# Patient Record
Sex: FEMALE | Race: WHITE | NOT HISPANIC OR LATINO | Employment: OTHER | URBAN - METROPOLITAN AREA
[De-identification: names, ages, dates, MRNs, and addresses within clinical notes are randomized per-mention and may not be internally consistent; named-entity substitution may affect disease eponyms.]

---

## 2017-12-26 PROBLEM — K57.92 DIVERTICULITIS OF INTESTINE: Status: ACTIVE | Noted: 2017-12-26

## 2018-09-01 ENCOUNTER — OFFICE VISIT (OUTPATIENT)
Dept: URGENT CARE | Facility: CLINIC | Age: 73
End: 2018-09-01
Payer: MEDICARE

## 2018-09-01 VITALS
HEIGHT: 64 IN | OXYGEN SATURATION: 100 % | BODY MASS INDEX: 21.17 KG/M2 | RESPIRATION RATE: 15 BRPM | DIASTOLIC BLOOD PRESSURE: 76 MMHG | WEIGHT: 124 LBS | SYSTOLIC BLOOD PRESSURE: 119 MMHG | TEMPERATURE: 97 F | HEART RATE: 61 BPM

## 2018-09-01 DIAGNOSIS — R30.0 DYSURIA: Primary | ICD-10-CM

## 2018-09-01 LAB
BILIRUB UR QL STRIP: NEGATIVE
GLUCOSE UR QL STRIP: NEGATIVE
KETONES UR QL STRIP: NEGATIVE
LEUKOCYTE ESTERASE UR QL STRIP: NEGATIVE
PH, POC UA: 6 (ref 5–8)
POC BLOOD, URINE: NEGATIVE
POC NITRATES, URINE: NEGATIVE
PROT UR QL STRIP: NEGATIVE
SP GR UR STRIP: 1 (ref 1–1.03)
UROBILINOGEN UR STRIP-ACNC: NORMAL (ref 0.1–1.1)

## 2018-09-01 PROCEDURE — 99204 OFFICE O/P NEW MOD 45 MIN: CPT | Mod: 25,S$GLB,, | Performed by: FAMILY MEDICINE

## 2018-09-01 PROCEDURE — 81003 URINALYSIS AUTO W/O SCOPE: CPT | Mod: QW,S$GLB,, | Performed by: FAMILY MEDICINE

## 2018-09-01 NOTE — PROGRESS NOTES
"Subjective:       Patient ID: Ethel Oliveira is a 72 y.o. female.    Vitals:  height is 5' 4" (1.626 m) and weight is 56.2 kg (124 lb). Her oral temperature is 97.1 °F (36.2 °C). Her blood pressure is 119/76 and her pulse is 61. Her respiration is 15 and oxygen saturation is 100%.     Chief Complaint: Urinary Tract Infection    PATIENT WAS SEEN IN THE EMERGENCY DEPARTMENT SERVRAL TIMES RECENTLY FOR CHRONIC ABDOMINAL PAIN, CONSTIPATION. SHE WAS ADVISED TO USE MAG. CITRATE AND HAD WATERY STOOLS AFTERWARDS. SHE WOULD LIKE TO R/O UTI SINCE HAVING WATERY STOOLS. SHE DENIES DYSURIA, URINARY URGENCY/FREQUENCY.      Urinary Tract Infection    This is a new problem. The current episode started acute onset. There has been no fever. Pertinent negatives include no chills, hematuria, nausea, urgency or vomiting.     Review of Systems   Constitution: Negative for chills and fever.   Skin: Negative for itching.   Musculoskeletal: Negative for back pain.   Gastrointestinal: Negative for abdominal pain, nausea and vomiting.   Genitourinary: Negative for dysuria, genital sores, hematuria, missed menses, non-menstrual bleeding and urgency.       Objective:      Physical Exam   Constitutional: She appears well-developed and well-nourished.   HENT:   Head: Normocephalic and atraumatic.   Cardiovascular: Regular rhythm.   Pulmonary/Chest: Breath sounds normal.   Abdominal: Soft. Bowel sounds are normal. There is no tenderness. There is no guarding. A hernia is present.       Assessment:       1. Dysuria        Plan:         Dysuria  -     POCT Urinalysis, Dipstick, Automated, W/O Scope     Urinalysis normal no signs of infection patient reassured she should discuss questions about her constipation and adhesions with her colorectal surgeon.  Stable for discharge.     "

## 2018-12-14 PROBLEM — K43.9 VENTRAL HERNIA WITHOUT OBSTRUCTION OR GANGRENE: Status: ACTIVE | Noted: 2018-12-14

## 2019-03-25 ENCOUNTER — OFFICE VISIT (OUTPATIENT)
Dept: URGENT CARE | Facility: CLINIC | Age: 74
End: 2019-03-25
Payer: MEDICARE

## 2019-03-25 VITALS
OXYGEN SATURATION: 100 % | HEIGHT: 64 IN | RESPIRATION RATE: 16 BRPM | DIASTOLIC BLOOD PRESSURE: 82 MMHG | WEIGHT: 123 LBS | SYSTOLIC BLOOD PRESSURE: 128 MMHG | TEMPERATURE: 97 F | HEART RATE: 63 BPM | BODY MASS INDEX: 21 KG/M2

## 2019-03-25 DIAGNOSIS — G89.29 CHRONIC ABDOMINAL PAIN: Primary | ICD-10-CM

## 2019-03-25 DIAGNOSIS — R10.9 CHRONIC ABDOMINAL PAIN: Primary | ICD-10-CM

## 2019-03-25 PROCEDURE — 99214 PR OFFICE/OUTPT VISIT, EST, LEVL IV, 30-39 MIN: ICD-10-PCS | Mod: S$GLB,,, | Performed by: FAMILY MEDICINE

## 2019-03-25 PROCEDURE — 99214 OFFICE O/P EST MOD 30 MIN: CPT | Mod: S$GLB,,, | Performed by: FAMILY MEDICINE

## 2019-03-25 NOTE — PATIENT INSTRUCTIONS
Unknown Causes of Abdominal Pain (Female)    The exact cause of your abdominal (stomach) pain is not clear. This does not mean that this is something to worry about. Everyone likes to know the exact cause of the problem, but sometimes with abdominal pain, there is no clear-cut cause, and this could be a good thing. The good news is that your symptoms can be treated, and you will feel better.   Your condition does not seem serious now; however, sometimes the signs of a serious problem may take more time to appear. For this reason, it is important for you to watch for any new symptoms, problems, or worsening of your condition.  Over the next few days, the abdominal pain may come and go, or be continuous. Other common symptoms can include nausea and vomiting. Sometimes it can be difficult to tell if you feel nauseous, you may just feel bad and not associate that feeling with nausea. Constipation, diarrhea, and a fever may go along with the pain.  The pain may continue even if treated correctly over the following days. Depending on how things go, sometimes the cause can become clear and may require further or different treatment. Additional evaluations, medications, or tests may also be needed.  Home care  Your healthcare provider may prescribe medicine for pain, symptoms, or an infection.  Follow the healthcare provider's instructions for taking these medicines.  General care  · Rest as much as you can until your next exam. No strenuous activities.  · Try to find positions that ease discomfort. A small pillow placed on the abdomen may help relieve pain.  · Something warm on your abdomen (such as a heating pad) may help, but be careful not to burn yourself.  Diet  · Do not force yourself to eat, especially if having cramps, vomiting, or diarrhea.  · Water is important so you do not get dehydrated. Soup may also be good. Sports drinks may also help, especially if they are not too acidic. Make sure you don't drink  sugary drinks as this can make things worse. Take liquids in small amounts. Do not guzzle them.  · Caffeine sometimes makes the pain and cramping worse.  · Avoid dairy products if you have vomiting or diarrhea.  · Don't eat large amounts at a time. Wait a few minutes between bites.  · Eat a diet low in fiber (called a low-residue diet). Foods allowed include refined breads, white rice, fruit and vegetable juices without pulp, tender meats. These foods will pass more easily through the intestine.  · Avoid whole-grain foods, whole fruits and vegetables, meats, seeds and nuts, fried or fatty foods, dairy, alcohol and spicy foods until your symptoms go away.  Follow-up care  Follow up with your healthcare provider, or as advised, if your pain does not begin to improve in the next 24 hours.  Call 911  Call 911 if any of these occur:  · Trouble breathing  · Confusion  · Fainting or loss of consciousness  · Rapid heart rate  · Seizure  When to seek medical advice  Call your healthcare provider right away if any of these occur:  · Pain gets worse or moves to the right lower abdomen  · New or worsening vomiting or diarrhea  · Swelling of the abdomen  · Unable to pass stool for more than 3 days  · Fever of 100.4ºF (38ºC) or higher, or as directed by your healthcare provider.  · Blood in vomit or bowel movements (dark red or black color)  · Jaundice (yellow color of eyes and skin)  · Weakness, dizziness  · Chest, arm, back, neck or jaw pain  · Unexpected vaginal bleeding or missed period  · Can't keep down liquids or water and are getting dehydrated  Date Last Reviewed: 12/30/2015  © 5209-3938 [employer]. [address] [place] [address], [place] [address]. All rights reserved. This information is not intended as a substitute for professional medical care. Always follow your healthcare professional's instructions.

## 2019-03-25 NOTE — PROGRESS NOTES
"Subjective:       Patient ID: Ethel Oliveira is a 73 y.o. female.    Vitals:  height is 5' 4" (1.626 m) and weight is 55.8 kg (123 lb). Her oral temperature is 96.9 °F (36.1 °C). Her blood pressure is 128/82 and her pulse is 63. Her respiration is 16 and oxygen saturation is 100%.     Chief Complaint: Rib Injury (pt states that her ribs on the left side are hurting.  States that she had a hernia operation in December and its been hurting ever since. )    Chest Pain    This is a recurrent problem. The current episode started more than 1 month ago. The problem occurs constantly. The problem has been unchanged. The pain is at a severity of 8/10. The pain is severe. The pain does not radiate. Associated symptoms include abdominal pain and back pain. Pertinent negatives include no cough, dizziness, fever, headaches, nausea, shortness of breath, vomiting or weakness. The pain is aggravated by nothing. She has tried nothing for the symptoms.     reviewing the patient's old chart she has apparently been seen by her surgeon as well as an emergency room visit for similar complaints.  Her workup has not been revealing for significant problems. She presents today wishing to be reassured that there is nothing new or dangerous going on and for a 3rd opinion.  Knee this year  states that there is any significant change in the symptoms to dental today or this week simply that she is continues to be discomfort.  She denies fever chills worsening abdominal pain vomiting diarrhea or acute change in bowel or bladder habits.    Constitution: Negative for chills, fatigue and fever.   HENT: Negative for congestion and sore throat.    Neck: Negative for painful lymph nodes.   Cardiovascular: Positive for chest pain. Negative for leg swelling.   Eyes: Negative for double vision and blurred vision.   Respiratory: Negative for cough and shortness of breath.    Gastrointestinal: Positive for abdominal pain. Negative for nausea, vomiting " and diarrhea.   Genitourinary: Negative for dysuria, frequency, urgency and history of kidney stones.   Musculoskeletal: Positive for back pain. Negative for joint pain, joint swelling, muscle cramps and muscle ache.   Skin: Negative for color change, pale, rash and bruising.   Allergic/Immunologic: Negative for seasonal allergies.   Neurological: Negative for dizziness, history of vertigo, light-headedness, passing out and headaches.   Hematologic/Lymphatic: Negative for swollen lymph nodes.   Psychiatric/Behavioral: Negative for nervous/anxious, sleep disturbance and depression. The patient is not nervous/anxious.        Objective:      Physical Exam   Constitutional: She is oriented to person, place, and time. She appears well-developed and well-nourished.   HENT:   Head: Normocephalic and atraumatic.   Nose: Nose normal.   Mouth/Throat: Oropharynx is clear and moist and mucous membranes are normal.   Eyes: Pupils are equal, round, and reactive to light. Conjunctivae, EOM and lids are normal. Right eye exhibits no discharge. Left eye exhibits no discharge. No scleral icterus.   Neck: Trachea normal, normal range of motion and full passive range of motion without pain. Neck supple.   Cardiovascular: Normal rate, regular rhythm and normal heart sounds.   Pulmonary/Chest: Effort normal and breath sounds normal. No respiratory distress.   Abdominal: Soft. Normal appearance and bowel sounds are normal. She exhibits no distension, no abdominal bruit, no pulsatile midline mass and no mass. There is no tenderness.   No significant abdominal tenderness to direct palpation bowel sounds are present no rebound referred pain or other positive peritoneal findings   Musculoskeletal: Normal range of motion. She exhibits no edema.   Neurological: She is alert and oriented to person, place, and time. She has normal strength. No cranial nerve deficit.   Skin: Skin is warm, dry and intact. She is not diaphoretic.   Psychiatric: She  has a normal mood and affect. Her speech is normal and behavior is normal. Cognition and memory are normal.   Nursing note and vitals reviewed.      Assessment:       1. Chronic abdominal pain        Plan:         Chronic abdominal pain     Next warm the patient and her  that I do not see any acute issues here that require urgent intervention.  They are encouraged to keep the appointment she has in 2 weeks with gastroenterologist to follow up for additional evaluation.  I am not recommending any additional prescriptions and she does not wish to have any.

## 2019-03-28 ENCOUNTER — TELEPHONE (OUTPATIENT)
Dept: URGENT CARE | Facility: CLINIC | Age: 74
End: 2019-03-28

## 2019-03-28 NOTE — TELEPHONE ENCOUNTER
Patient callback- Pt states that she is not feeling better but she has a GI appointment scheduled for next week.

## 2019-06-28 PROBLEM — R10.9 ABDOMINAL PAIN: Status: ACTIVE | Noted: 2019-06-28

## 2019-10-01 ENCOUNTER — OFFICE VISIT (OUTPATIENT)
Dept: GASTROENTEROLOGY | Facility: CLINIC | Age: 74
End: 2019-10-01
Payer: MEDICARE

## 2019-10-01 VITALS
DIASTOLIC BLOOD PRESSURE: 73 MMHG | BODY MASS INDEX: 20.73 KG/M2 | WEIGHT: 129 LBS | HEART RATE: 69 BPM | SYSTOLIC BLOOD PRESSURE: 106 MMHG | HEIGHT: 66 IN | RESPIRATION RATE: 19 BRPM

## 2019-10-01 DIAGNOSIS — Z87.19 HISTORY OF CHRONIC CONSTIPATION: ICD-10-CM

## 2019-10-01 DIAGNOSIS — Z87.19 HISTORY OF GASTRITIS: ICD-10-CM

## 2019-10-01 DIAGNOSIS — R10.84 GENERALIZED ABDOMINAL PAIN: Primary | ICD-10-CM

## 2019-10-01 DIAGNOSIS — Z90.49 S/P PARTIAL RESECTION OF COLON: ICD-10-CM

## 2019-10-01 DIAGNOSIS — R13.14 PHARYNGOESOPHAGEAL DYSPHAGIA: ICD-10-CM

## 2019-10-01 DIAGNOSIS — Z90.49 S/P CHOLECYSTECTOMY: ICD-10-CM

## 2019-10-01 PROCEDURE — 99214 PR OFFICE/OUTPT VISIT, EST, LEVL IV, 30-39 MIN: ICD-10-PCS | Mod: S$PBB,,, | Performed by: NURSE PRACTITIONER

## 2019-10-01 PROCEDURE — 99215 OFFICE O/P EST HI 40 MIN: CPT | Mod: PBBFAC,PO | Performed by: NURSE PRACTITIONER

## 2019-10-01 PROCEDURE — 99999 PR PBB SHADOW E&M-EST. PATIENT-LVL V: ICD-10-PCS | Mod: PBBFAC,,, | Performed by: NURSE PRACTITIONER

## 2019-10-01 PROCEDURE — 99999 PR PBB SHADOW E&M-EST. PATIENT-LVL V: CPT | Mod: PBBFAC,,, | Performed by: NURSE PRACTITIONER

## 2019-10-01 PROCEDURE — 99214 OFFICE O/P EST MOD 30 MIN: CPT | Mod: S$PBB,,, | Performed by: NURSE PRACTITIONER

## 2019-10-01 RX ORDER — HYDROCORTISONE ACETATE PRAMOXINE HCL 2.5; 1 G/100G; G/100G
CREAM TOPICAL 2 TIMES DAILY PRN
Refills: 3 | COMMUNITY
Start: 2019-09-20

## 2019-10-01 NOTE — PATIENT INSTRUCTIONS
Abdominal Pain    Abdominal pain is pain in the stomach or belly area. Everyone has this pain from time to time. In many cases it goes away on its own. But abdominal pain can sometimes be due to a serious problem, such as appendicitis. So its important to know when to seek help.  Causes of abdominal pain  There are many possible causes of abdominal pain. Common causes in adults include:  · Constipation, diarrhea, or gas  · Stomach acid flowing back up into the esophagus (acid reflux or heartburn)  · Severe acid reflux, called GERD (gastroesophageal reflux disease)  · A sore in the lining of the stomach or small intestine (peptic ulcer)  · Inflammation of the gallbladder, liver, or pancreas  · Gallstones or kidney stones  · Appendicitis   · Intestinal blockage   · An internal organ pushing through a muscle or other tissue (hernia)  · Urinary tract infections  · In women, menstrual cramps, fibroids, or endometriosis  · Inflammation or infection of the intestines  Diagnosing the cause of abdominal pain  Your healthcare provider will do a physical exam help find the cause of your pain. If needed, tests will be ordered. Belly pain has many possible causes. So it can be hard to find the reason for your pain. Giving details about your pain can help. Tell your provider where and when you feel the pain, and what makes it better or worse. Also let your provider know if you have other symptoms such as:  · Fever  · Tiredness  · Upset stomach (nausea)  · Vomiting  · Changes in bathroom habits  Treating abdominal pain  Some causes of pain need emergency medical treatment right away. These include appendicitis or a bowel blockage. Other problems can be treated with rest, fluids, or medicines. Your healthcare provider can give you specific instructions for treatment or self-care based on what is causing your pain.  If you have vomiting or diarrhea, sip water or other clear fluids. When you are ready to eat solid foods again,  start with small amounts of easy-to-digest, low-fat foods. These include apple sauce, toast, or crackers.   When to seek medical care  Call 911 or go to the hospital right away if you:  · Cant pass stool and are vomiting  · Are vomiting blood or have bloody diarrhea or black, tarry diarrhea  · Have chest, neck, or shoulder pain  · Feel like you might pass out  · Have pain in your shoulder blades with nausea  · Have sudden, severe belly pain  · Have new, severe pain unlike any you have felt before  · Have a belly that is rigid, hard, and tender to touch  Call your healthcare provider if you have:  · Pain for more than 5 days  · Bloating for more than 2 days  · Diarrhea for more than 5 days  · A fever of 100.4°F (38.0°C) or higher, or as directed by your provider  · Pain that gets worse  · Weight loss for no reason  · Continued lack of appetite  · Blood in your stool  How to prevent abdominal pain  Here are some tips to help prevent abdominal pain:  · Eat smaller amounts of food at one time.  · Avoid greasy, fried, or other high-fat foods.  · Avoid foods that give you gas.  · Exercise regularly.  · Drink plenty of fluids.  To help prevent GERD symptoms:  · Quit smoking.  · Reduce alcohol and certain foods that increase stomach acid.  · Avoid aspirin and over-the-counter pain and fever medicines (NSAIDS or nonsteroidal anti-inflammatory drugs), if possible  · Lose extra weight.  · Finish eating at least 2 hours before you go to bed or lie down.  · Raise the head of your bed.  Date Last Reviewed: 7/1/2016  © 0796-6770 [employer]. [address] [place] [address], [place] [address]. All rights reserved. This information is not intended as a substitute for professional medical care. Always follow your healthcare professional's instructions.          Discharge Instructions: Eating a Soft Diet  You have been prescribed a soft diet (also called gastrointestinal soft diet or bland diet). This reduces the amount of work  your digestive tract has to do. It also reduces the chance that your digestive tract will be irritated by the food you eat. A soft diet is prescribed for people with digestive problems. The diet consists of foods that are tender, mildly seasoned, and easy to digest. While on this diet, you should not eat fried or spicy foods, or raw fruits and vegetables. Also avoid alcoholic beverages.  General guidelines  · Eat in a calm, relaxed atmosphere. How you eat may be as important as what you eat. Dont rush while eating. Chew your food slowly and thoroughly, and swallow slowly.  · Eat small frequent meals throughout the day, but dont eat within 2 hours of bedtime.  · Avoid any foods that cause discomfort.  · Dont use NSAIDs (nonsteroidal anti-inflammatory drugs), such as aspirin, and ibuprofen. Also avoid medicine that contain aspirin. NSAIDs can cause ulcers and delay or prevent ulcer healing.  · Use antacids as needed, but keep in mind that magnesium-containing antacids may cause diarrhea.  Foods to eat  · Cream of wheat and cream of rice  · Cooked white rice  · Mashed potatoes, and boiled potatoes without skin  · Plain pasta and noodles  · Plain white crackers (such as no-salt soda crackers)  · White bread  · Applesauce  · Cooked fruits without skins or seeds  · Mild juices, such as apple and grape  · Bananas  · Cooked or mashed vegetables without stems and seeds  ? Carrots  ? Summer squash (zucchini, yellow squash)  ? Winter squash (acorn, butternut, spaghetti squash)  · Cottage cheese  · Mild hard or soft cheeses  · Custard  · Yogurt without seeds or nuts  · Milk (you may need lactose-free milk)  · Ice cream without seeds or nuts  · Smooth peanut butter  · Eggs  · Fish, turkey, chicken, or other meat that is not tough or stringy  · Tofu  Foods to avoid  · Nuts and seeds  · Snack foods, such as the following:  ? Chocolate-containing snacks, candy, pastries, or cakes.  ? Potato chips (plain, barbecued, or other  flavors)  ? Taco chips or nachos  ? Corn chips  ? Popcorn, popcorn cakes, or rice cakes  ? Crackers with nuts, seeds, or spicy seasonings  ? French fries  · Fried or greasy foods  · Whole-grain breads, rolls, and crackers  · Breads and rolls with nuts, seeds, or bran  · Bran and granola cereals  · Berries with seeds, such as strawberries, raspberries, and blackberries  · Acidic fruits, such as oranges, grapefruits, aleksander, limes, and pineapples  · Raw vegetables  · Mild or hot peppers  · Sauerkraut and pickled vegetables  · Tomatoes or tomato products, such as tomato paste, tomato sauce, and tomato juice  · Barbecue sauce  · Spicy or flavored cheeses, such as jalapeño and black pepper cheese  · Crunchy peanut butter  · Dried cooked beans, such as manzo, kidney, or navy beans  · The following meats:  ? Fried or greasy meats  ? Processed, spicy meats, such as sausage, ambriz, ham, and lunch meats  ? Ribs and other meats with barbecue sauce  ? Tough or stringy meats, such as corned beef or beef jerky  Fluids to avoid  · Alcoholic beverages  · Coffee and regular teas  · Tahir and other drinks with caffeine  · Cranberry, orange, pineapple, and grapefruit juice  · Lemonade  · Vegetable juice  · Whole milk, if you are lactose intolerant  Follow-up  Make a follow-up appointment with a dietitian as directed by our staff.  Date Last Reviewed: 6/21/2015 © 2000-2017 [employer]. [address] [place] [address]. All rights reserved. This information is not intended as a substitute for professional medical care. Always follow your healthcare professional's instructions.          Constipation (Adult)  Constipation means that you have bowel movements that are less frequent than usual. Stools often become very hard and difficult to pass.  Constipation is very common. At some point in life it affects almost everyone. Since everyone's bowel habits are different, what is constipation to one person may not be to  another. Your healthcare provider may do tests to diagnose constipation. It depends on what he or she finds when evaluating you.    Symptoms of constipation include:  · Abdominal pain  · Bloating  · Vomiting  · Painful bowel movements  · Itching, swelling, bleeding, or pain around the anus  Causes  Constipation can have many causes. These include:  · Diet low in fiber  · Too much dairy  · Not drinking enough liquids  · Lack of exercise or physical activity. This is especially true for older adults.  · Changes in lifestyle or daily routine, including pregnancy, aging, work, and travel  · Frequent use or misuse of laxatives  · Ignoring the urge to have a bowel movement or delaying it until later  · Medicines, such as certain prescription pain medicines, iron supplements, antacids, certain antidepressants, and calcium supplements  · Diseases like irritable bowel syndrome, bowel obstructions, stroke, diabetes, thyroid disease, Parkinson disease, hemorrhoids, and colon cancer  Complications  Potential complications of constipation can include:  · Hemorrhoids  · Rectal bleeding from hemorrhoids or anal fissures (skin tears)  · Hernias  · Dependency on laxatives  · Chronic constipation  · Fecal impaction  · Bowel obstruction or perforation  Home care  All treatment should be done after talking with your healthcare provider. This is especially true if you have another medical problems, are taking prescription medicines, or are an older adult. Treatment most often involves lifestyle changes. You may also need medicines. Your healthcare provider will tell you which will work best for you. Follow the advice below to help avoid this problem in the future.  Lifestyle changes  These lifestyle changes can help prevent constipation:  · Diet. Eat a high-fiber diet, with fresh fruit and vegetables, and reduce dairy intake, meats, and processed foods  · Fluids. It's important to get enough fluids each day. Drink plenty of water when  you eat more fiber. If you are on diet that limits the amount of fluid you can have, talk about this with your healthcare provider.  · Regular exercise. Check with your healthcare provider first.  Medications  Take any medicines as directed. Some laxatives are safe to use only every now and then. Others can be taken on a regular basis. Talk with your doctor or pharmacist if you have questions.  Prescription pain medicines can cause constipation. If you are taking this kind of medicine, ask your healthcare provider if you should also take a stool softener.  Medicines you may take to treat constipation include:  · Fiber supplements  · Stool softeners  · Laxatives  · Enemas  · Rectal suppositories  Follow-up care  Follow up with your healthcare provider if symptoms don't get better in the next few days. You may need to have more tests or see a specialist.  Call 911  Call 911 if any of these occur:  · Trouble breathing  · Stiff, rigid abdomen that is severely painful to touch  · Confusion  · Fainting or loss of consciousness  · Rapid heart rate  · Chest pain  When to seek medical advice  Call your healthcare provider right away if any of these occur:  · Fever over 100.4°F (38°C)  · Failure to resume normal bowel movements  · Pain in your abdomen or back gets worse  · Nausea or vomiting  · Swelling in your abdomen  · Blood in the stool  · Black, tarry stool  · Involuntary weight loss  · Weakness  Date Last Reviewed: 12/30/2015  © 0823-9700 [employer]. [address] [place] [id number]. All rights reserved. This information is not intended as a substitute for professional medical care. Always follow your healthcare professional's instructions.

## 2019-10-01 NOTE — PROGRESS NOTES
"Subjective:       Patient ID: Ethel Oliveira is a 73 y.o. female Body mass index is 20.82 kg/m².    Chief Complaint: Establish Care (abd pain, hx of colon resection, hx of hernia repair)    This patient is new to me.     Patient with history of chronic abdominal pain and chart shows multiple ER visits for abdominal pain since at least 2018.  Patient is here with her , whom assisted with history. Patient reports they wanted a second opinion.    Abdominal Pain   This is a chronic problem. The current episode started more than 1 year ago (started in 2017 with diverticulitis, multiple bouts for this and decided to have colon resection for it in 12/2017, later had to have hernia repair 12/2018). The problem occurs constantly. The problem has been unchanged. The pain is located in the periumbilical region and generalized abdominal region (mostly to periumbilical). The pain is at a severity of 7/10 (currently). The quality of the pain is sharp and aching. Associated symptoms include belching, constipation (Bowel movements 1-2 times a day; controlled with glycolax 17 grams 1-2 times a day) and nausea ("very little", zofran prn). Pertinent negatives include no diarrhea, dysuria, fever, flatus, frequency, hematochezia, melena, vomiting or weight loss. The pain is aggravated by eating. She has tried proton pump inhibitors (protonix 40 mg daily, carafate 10 ml TID, analpram BID PRN- has not taken recently; bulbuca BID; PAST: motegrity- diarrhea, linzess- no relief, bentyl mild relief) for the symptoms. Prior diagnostic workup includes CT scan, GI consult, lower endoscopy and upper endoscopy (has been seeing Dr. Moore recently). Her past medical history is significant for abdominal surgery and GERD (denies any recently). There is no history of Crohn's disease or pancreatitis. Patient's medical history includes UTI (on macrobid currently).     Review of Systems   Constitutional: Positive for appetite change (decreased " "because it hurts when she eats). Negative for chills, fatigue, fever and weight loss.   HENT: Positive for trouble swallowing (occasional with food; denies problems with liquids or pills). Negative for sore throat.    Respiratory: Negative for cough, choking and shortness of breath.    Cardiovascular: Negative for chest pain.   Gastrointestinal: Positive for abdominal pain, constipation (Bowel movements 1-2 times a day; controlled with glycolax 17 grams 1-2 times a day) and nausea ("very little", zofran prn). Negative for anal bleeding, blood in stool, diarrhea, flatus, hematochezia, melena, rectal pain and vomiting.   Genitourinary: Negative for difficulty urinating, dysuria, flank pain and frequency.   Musculoskeletal:        Reports taking belbuca BID   Neurological: Negative for weakness.        History of tremor- seeing neurologist       No LMP recorded. Patient is postmenopausal.  Past Medical History:   Diagnosis Date    Anticoagulant long-term use     Anxiety     Breast cyst     Constipation     Diverticulitis     Diverticulosis     High cholesterol     Hyperlipemia     Hypertension     IBS (irritable bowel syndrome)     Sciatica     Tremor      Past Surgical History:   Procedure Laterality Date    APPENDECTOMY      BREAST BIOPSY Right 1990    benign excisional biopsy    CHOLECYSTECTOMY      COLONOSCOPY N/A 6/28/2019    Procedure: COLONOSCOPY;  Surgeon: Roger Moore MD;  Location: [facility];  Service: Endoscopy;  Laterality: N/A;    COLONOSCOPY  06/28/2019    Dr. Moore, in procedures: diverticulosis, repeat in 5 years for screening    ESOPHAGOGASTRODUODENOSCOPY N/A 6/28/2019    Procedure: EGD (ESOPHAGOGASTRODUODENOSCOPY);  Surgeon: Roger Moore MD;  Location: [facility];  Service: Endoscopy;  Laterality: N/A;    HERNIA REPAIR      LAPAROSCOPIC REPAIR OF VENTRAL HERNIA N/A 12/14/2018    Procedure: REPAIR, HERNIA, VENTRAL, LAPAROSCOPIC with Mesh;  Surgeon: Mahamed CAMPOS" MD [place];  Location: [facility] OR;  Service: General;  Laterality: N/A;    SIGMOIDECTOMY  12/26/2017    TONSILLECTOMY      UPPER GASTROINTESTINAL ENDOSCOPY  06/28/2019    Dr. Moore, in procedures: small hiatal hernia, Erythematous mucosa in the stomach; biopsy: duodenum WNL, antrum: GASTRIC ANTRAL MUCOSA WITH REACTIVE CHANGES CONSISTENT WITH HEALING EROSION, negative for h pylori     Family History   Problem Relation Age of Onset    Breast cancer Mother 75    Colon cancer Mother 80    Breast cancer Sister 60    Breast cancer Paternal Aunt 55    Breast cancer Sister 68    Crohn's disease Neg Hx     Ulcerative colitis Neg Hx     Stomach cancer Neg Hx     Esophageal cancer Neg Hx      Wt Readings from Last 20 Encounters:   10/01/19 58.5 kg (128 lb 15.5 oz)   09/26/19 59.3 kg (130 lb 11.7 oz)   09/05/19 59 kg (130 lb 1.1 oz)   08/17/19 59 kg (130 lb 1.1 oz)   07/23/19 58.1 kg (128 lb)   07/02/19 59.7 kg (131 lb 9.8 oz)   06/28/19 58.1 kg (128 lb 1.4 oz)   06/15/19 58.9 kg (129 lb 13.6 oz)   05/02/19 57.7 kg (127 lb 3.3 oz)   04/29/19 56.7 kg (125 lb)   04/18/19 55.6 kg (122 lb 9.2 oz)   04/11/19 55.8 kg (123 lb)   04/02/19 55.8 kg (123 lb 0.3 oz)   03/25/19 55.8 kg (123 lb)   03/09/19 56.1 kg (123 lb 10.9 oz)   01/19/19 58.6 kg (129 lb 3 oz)   01/03/19 58.1 kg (128 lb)   12/31/18 58.1 kg (128 lb)   12/28/18 58.2 kg (128 lb 4.9 oz)   12/20/18 57.6 kg (127 lb)     Lab Results   Component Value Date    WBC 5.66 09/26/2019    HGB 12.0 09/26/2019    HCT 36.1 (L) 09/26/2019    MCV 94 09/26/2019     09/26/2019     CMP  Sodium   Date Value Ref Range Status   09/26/2019 139 136 - 145 mmol/L Final     Potassium   Date Value Ref Range Status   09/26/2019 4.3 3.5 - 5.1 mmol/L Final     Chloride   Date Value Ref Range Status   09/26/2019 103 95 - 110 mmol/L Final     CO2   Date Value Ref Range Status   09/26/2019 29 22 - 31 mmol/L Final     Glucose   Date Value Ref Range Status   09/26/2019 103 70 - 110 mg/dL  Final     Comment:     The ADA recommends the following guidelines for fasting glucose:  Normal:       less than 100 mg/dL  Prediabetes:  100 mg/dL to 125 mg/dL  Diabetes:     126 mg/dL or higher       BUN, Bld   Date Value Ref Range Status   09/26/2019 16 7 - 18 mg/dL Final     Creatinine   Date Value Ref Range Status   09/26/2019 0.58 0.50 - 1.40 mg/dL Final     Calcium   Date Value Ref Range Status   09/26/2019 9.0 8.4 - 10.2 mg/dL Final     Total Protein   Date Value Ref Range Status   09/26/2019 7.5 6.0 - 8.4 g/dL Final     Albumin   Date Value Ref Range Status   09/26/2019 3.9 3.5 - 5.2 g/dL Final     Total Bilirubin   Date Value Ref Range Status   09/26/2019 1.1 0.2 - 1.3 mg/dL Final     Alkaline Phosphatase   Date Value Ref Range Status   09/26/2019 57 38 - 145 U/L Final     AST ([place])   Date Value Ref Range Status   03/18/2016 28 14 - 36 U/L Final     AST   Date Value Ref Range Status   09/26/2019 31 14 - 36 U/L Final     ALT   Date Value Ref Range Status   09/26/2019 18 10 - 44 U/L Final     Anion Gap   Date Value Ref Range Status   09/26/2019 7 (L) 8 - 16 mmol/L Final     eGFR if    Date Value Ref Range Status   09/26/2019 >60 >60 mL/min/1.73 m^2 Final     eGFR if non    Date Value Ref Range Status   09/26/2019 >60 >60 mL/min/1.73 m^2 Final     Comment:     Calculation used to obtain the estimated glomerular filtration  rate (eGFR) is the CKD-EPI equation.        Lab Results   Component Value Date    AMYLASE 52 06/30/2017     Lab Results   Component Value Date    LIPASE 58 04/23/2018     Lab Results   Component Value Date    LIPASERES 21 (L) 09/26/2019     Reviewed prior medical records including radiology report of 9/26/19 abdominal x-ray and ER visit note; 8/17/19 and 7/2/19 ct scans of abdomen pelvis; 9/5/19 ER visit note; 8/17/19 ER visit note; & endoscopy history (see surgical history).    Objective:      Physical Exam   Constitutional: She is oriented to  person, place, and time. She appears well-developed and well-nourished. No distress.   Patient uses a cane for assistance with ambulation. Tremor noted by frequent movement of jaw.   HENT:   Mouth/Throat: Oropharynx is clear and moist and mucous membranes are normal. No oral lesions. No oropharyngeal exudate.   Eyes: Pupils are equal, round, and reactive to light. Conjunctivae are normal. No scleral icterus.   Pulmonary/Chest: Effort normal and breath sounds normal. No respiratory distress. She has no wheezes.   Abdominal: Soft. Normal appearance and bowel sounds are normal. She exhibits no distension, no abdominal bruit and no mass. There is tenderness (mild). There is no rigidity, no rebound, no guarding, no tenderness at McBurney's point and negative Dillard's sign.   Well-healed surgical scars noted.   Neurological: She is alert and oriented to person, place, and time.   Skin: Skin is warm and dry. No rash noted. She is not diaphoretic. No erythema. No pallor.   Non-jaundiced   Psychiatric: She has a normal mood and affect. Her behavior is normal. Judgment and thought content normal.   Nursing note and vitals reviewed.      Assessment:       1. Generalized abdominal pain    2. S/P partial resection of colon    3. S/P cholecystectomy    4. History of gastritis    5. History of chronic constipation    6. Pharyngoesophageal dysphagia        Plan:       Generalized abdominal pain & S/P cholecystectomy  -     VAS US Mesenteric Arterial; Future  - schedule EGD, discussed procedure with patient, including risks and benefits, patient verbalized understanding    History of gastritis  - CONTINUE PROTONIX 40 MG ONCE DAILY  - CONTINUE CARAFATE 1 GRAM TID AS DIRECTED  - avoid/minimize use of NSAIDs- since they can cause GI upset, bleeding and/or ulcers. If NSAID must be taken, recommend take with food.    History of chronic constipation & S/P partial resection of colon  Recommend daily exercise as tolerated, adequate water  intake (six 8-oz glasses of water daily), and high fiber diet. OTC fiber supplements are recommended if diet does not reach daily fiber goal (20-30 grams daily), such as Metamucil, Citrucel, or FiberCon (take as directed, separate from other oral medications by >2 hours).  -Recommend taking an OTC stool softener such as Colace as directed to avoid hard stools and straining with bowel movements PRN  -CONTINUE OTC MiraLax (AKA GLYCOLAX) once daily (17g PO) as directed  - If no improvement with above recommendations, try intermittently dosed Dulcolax OTC as directed (every 3-4  days) PRN to facilitate bowel movements  -If still no improvement with these measures, call/follow-up    Pharyngoesophageal dysphagia  - schedule EGD, discussed procedure with patient and possible esophageal dilation may be performed during procedure if indicated, patient verbalized understanding  - educated patient to eat smaller more frequent meals and to eat slowly and advised to eat a soft diet.  - possible UGI/esophagram/esophageal manometry if symptoms persist    Follow up in about 1 month (around 11/1/2019), or if symptoms worsen or fail to improve, for follow-up with Dr. Avery for continued evaluation and management.      If no improvement in symptoms or symptoms worsen, call/follow-up at clinic or go to ER.

## 2019-10-16 ENCOUNTER — TELEPHONE (OUTPATIENT)
Dept: GASTROENTEROLOGY | Facility: CLINIC | Age: 74
End: 2019-10-16

## 2019-10-16 NOTE — TELEPHONE ENCOUNTER
----- Message from Silvia Ryder sent at 10/16/2019 10:35 AM CDT -----  Contact: Jim  Type: Needs Medical Advice    Who Called:  Patient's  Jim  Symptoms (please be specific):    How long has patient had these symptoms:    Pharmacy name and phone #:    Best Call Back Number: [phone]  Additional Information: requesting to cancel procedure for 11/12

## 2019-10-16 NOTE — TELEPHONE ENCOUNTER
Spoke with pt's , Jim. Jim canceled pt's EGD & office appointment in November. Jim did not want to reschedule anything.     EGD canceled.

## 2019-10-18 PROBLEM — R13.10 DYSPHAGIA: Status: ACTIVE | Noted: 2019-10-18

## 2019-12-19 ENCOUNTER — TELEPHONE (OUTPATIENT)
Dept: GASTROENTEROLOGY | Facility: CLINIC | Age: 74
End: 2019-12-19

## 2019-12-19 NOTE — TELEPHONE ENCOUNTER
----- Message from Krissy Jones sent at 12/19/2019 11:06 AM CST -----  Contact: Jim Oliveira (Spouse)  Type:  Sooner Apoointment Request    Caller is requesting a sooner appointment.     Name of Caller:  Jim Oliveira (Spouse)  When is the first available appointment?  02/03/2020  Symptoms:  GI issues/ f/u  Best Call Back Number:  [phone]  Additional Information:

## 2021-04-29 ENCOUNTER — PATIENT MESSAGE (OUTPATIENT)
Dept: RESEARCH | Facility: HOSPITAL | Age: 76
End: 2021-04-29

## 2022-03-10 NOTE — TELEPHONE ENCOUNTER
----- Message from Desiree Hendrix sent at 12/19/2019  4:02 PM CST -----  Type:  Patient Returning Call    Who Called:  Patient  Who Left Message for Patient:  Vonda  Does the patient know what this is regarding?:  appt  Best Call Back Number:  [phone]  
Pt keeping scheduled appt on 2/3/2020 with .  
There are no Wet Read(s) to document.

## 2024-08-21 ENCOUNTER — LAB VISIT (OUTPATIENT)
Dept: LAB | Facility: HOSPITAL | Age: 79
End: 2024-08-21
Attending: INTERNAL MEDICINE
Payer: MEDICARE

## 2024-08-21 DIAGNOSIS — R30.0 DYSURIA: ICD-10-CM

## 2024-08-21 DIAGNOSIS — R30.0 DYSURIA: Primary | ICD-10-CM

## 2024-08-21 LAB
BILIRUB UR QL STRIP: NEGATIVE
CLARITY UR REFRACT.AUTO: CLEAR
COLOR UR AUTO: COLORLESS
GLUCOSE UR QL STRIP: NEGATIVE
HGB UR QL STRIP: NEGATIVE
KETONES UR QL STRIP: NEGATIVE
LEUKOCYTE ESTERASE UR QL STRIP: ABNORMAL
MICROSCOPIC COMMENT: NORMAL
NITRITE UR QL STRIP: NEGATIVE
PH UR STRIP: 6 [PH] (ref 5–8)
PROT UR QL STRIP: NEGATIVE
RBC #/AREA URNS AUTO: 1 /HPF (ref 0–4)
SP GR UR STRIP: 1.01 (ref 1–1.03)
SQUAMOUS #/AREA URNS AUTO: 1 /HPF
URN SPEC COLLECT METH UR: ABNORMAL
WBC #/AREA URNS AUTO: 4 /HPF (ref 0–5)

## 2024-08-21 PROCEDURE — 81001 URINALYSIS AUTO W/SCOPE: CPT | Performed by: INTERNAL MEDICINE

## 2024-08-21 PROCEDURE — 87086 URINE CULTURE/COLONY COUNT: CPT | Performed by: INTERNAL MEDICINE

## 2024-08-22 LAB — BACTERIA UR CULT: NORMAL
